# Patient Record
Sex: FEMALE | URBAN - NONMETROPOLITAN AREA
[De-identification: names, ages, dates, MRNs, and addresses within clinical notes are randomized per-mention and may not be internally consistent; named-entity substitution may affect disease eponyms.]

---

## 2021-10-22 VITALS
WEIGHT: 150 LBS | HEART RATE: 80 BPM | RESPIRATION RATE: 16 BRPM | OXYGEN SATURATION: 96 % | BODY MASS INDEX: 30.24 KG/M2 | HEIGHT: 59 IN | SYSTOLIC BLOOD PRESSURE: 120 MMHG | DIASTOLIC BLOOD PRESSURE: 68 MMHG

## 2021-10-22 DIAGNOSIS — M54.50 LOW BACK PAIN, UNSPECIFIED BACK PAIN LATERALITY, UNSPECIFIED CHRONICITY, UNSPECIFIED WHETHER SCIATICA PRESENT: ICD-10-CM

## 2021-10-22 DIAGNOSIS — M54.2 CERVICALGIA: ICD-10-CM

## 2021-10-22 RX ORDER — ACETAMINOPHEN 500 MG
1000 TABLET ORAL EVERY 6 HOURS PRN
COMMUNITY

## 2021-10-22 RX ORDER — GABAPENTIN 600 MG/1
600 TABLET ORAL 4 TIMES DAILY
COMMUNITY

## 2021-10-22 RX ORDER — MELOXICAM 15 MG/1
15 TABLET ORAL DAILY
COMMUNITY

## 2021-10-22 RX ORDER — SERTRALINE HYDROCHLORIDE 100 MG/1
100 TABLET, FILM COATED ORAL DAILY
COMMUNITY

## 2021-11-10 ENCOUNTER — OFFICE VISIT (OUTPATIENT)
Dept: PAIN MANAGEMENT | Age: 61
End: 2021-11-10
Payer: MEDICARE

## 2021-11-10 ENCOUNTER — TELEPHONE (OUTPATIENT)
Dept: PAIN MANAGEMENT | Age: 61
End: 2021-11-10

## 2021-11-10 VITALS — SYSTOLIC BLOOD PRESSURE: 124 MMHG | DIASTOLIC BLOOD PRESSURE: 78 MMHG

## 2021-11-10 DIAGNOSIS — M47.817 LUMBOSACRAL SPONDYLOSIS WITHOUT MYELOPATHY: Primary | ICD-10-CM

## 2021-11-10 DIAGNOSIS — M47.812 CERVICAL FACET SYNDROME: ICD-10-CM

## 2021-11-10 DIAGNOSIS — M47.817 LUMBOSACRAL SPONDYLOSIS WITHOUT MYELOPATHY: ICD-10-CM

## 2021-11-10 DIAGNOSIS — M47.816 LUMBAR FACET JOINT SYNDROME: ICD-10-CM

## 2021-11-10 DIAGNOSIS — M51.36 DDD (DEGENERATIVE DISC DISEASE), LUMBAR: ICD-10-CM

## 2021-11-10 PROCEDURE — 99204 OFFICE O/P NEW MOD 45 MIN: CPT | Performed by: PHYSICAL MEDICINE & REHABILITATION

## 2021-11-10 ASSESSMENT — ENCOUNTER SYMPTOMS
RESPIRATORY NEGATIVE: 1
CONSTIPATION: 0
BACK PAIN: 1
EYES NEGATIVE: 1
ALLERGIC/IMMUNOLOGIC NEGATIVE: 1
DIARRHEA: 0

## 2021-11-10 NOTE — TELEPHONE ENCOUNTER
[employer] ID# [id number]    PA for FJI  CPT [id number], [id number]  Dx- M47.817, M47.816, M51.36, Lumbar spondylosis wo myelopathy, Lumbar facet joint syndrome, DDD lumbar          I called [facility]. They do not do the PA for this pt. Contact Thaddeus

## 2021-11-10 NOTE — PATIENT INSTRUCTIONS
[facility]  [address], [place]    PROCEDURE INSTRUCTIONS FOR PAIN MANAGEMENT PROCEDURES    You are scheduled to see Dr. Marcos Camp to undergo the following procedure: Facet joint injection both sides L4-L5, L5-S1     Procedure Date: Wednesday December 15, 2021    Arrival Time: Shameka Santamaria will receive a call from [facility] to inform you of your arrival time the day prior to your procedure. Enter the facility through the ER doors and take the elevator to the 2nd floor and check in at same day surgery. 1. Stop the following medications prior to the procedure:     Stop blood thinners as directed before the injection, with permission from your cardiologist or primary care physician. We will send a letter to them requesting permission to hold the blood thinners. 2.  Take all routine medications unless otherwise instructed. Ok to take vitamins and antiinflammatory medications    3. EATING & DRINKING:  YOUR PROCEDURE MAY REQUIRE ANESTHESIA, NO FOOD OR LIQUIDS FOR 8 HOURS PRIOR TO THE PROCEDURE    4. If you are allergic to contrast or iodine, you must take benadryl and prednisone prior to the injection to prevent an allergic reaction. Follow the directions on the prescription for the times to take the medication. 5.  Wear simple loose clothing, which can be easily changed. 6.  Leave jewelry (including rings) and other valuables at home. 7.  Make arrangements for a family member or friend to drive you to the surgery center. Your ride must stay in the hospital while you are having the injection done. The sedation may affect your judgment following the procedure and driving a vehicle within 24 hours after the sedation could be dangerous. 8.  You will be asked to sign several forms prior to surgery; patients under the age of 25 must have a parent or legal guardian sign the permit to be able to do the procedure.       9.  You must have finished any antibiotic prescribed for recent infections. If required, please take pre-procedure antibiotic or other pre-procedure medications as instructed. 10. Bring inhalers and pain medications with you to your procedure. 11. Bring your MRI/CT films if they were done outside of the [facility]. 12. If you should develop a cold, sore throat, cough, fever or other new indication of illness or infection, or are started on antibiotics within 2 weeks of the scheduled procedure, please notify the [facility] office as early as possible at [phone].

## 2021-11-10 NOTE — PROGRESS NOTES
PAIN MANAGEMENTNEW PATIENT CONSULTATION  11/10/21    Delmer Cordero  1960    ReferringProvider:  No referring provider defined for this encounter. Primary Care Physician:  Dhaval Anderson, APRN - CNP  [address] [facility]    HPIinformation obtained from the patient as well as the Comprehensive Pain ManagementHealth Questionnaire filled out by the patient. The questionnaire will be scannedinto the electronic chart and PMH, PSH, meds, and allergies will be updates accordingly. Subjective:       CHIEF COMPLAINT:  This is a57 y.o. female patientwho presents with a complaint of New Patient (referred by henrietta duong for neck pain & lower back.  )      PAIN HPI:    Pain since accident 2014  In    Bilateral lumbar   L hip    B  Neck  Lower neck     L arm  Had fractures      Location: Back  Location Modifier: Brendon  Severity of Pain: 3  Duration of Pain: Intermittent  Frequency of Pain: Intermittent  Aggravating Factors: Stretching, Bending, Straightening, Standing, Walking, Stairs, Exercise, Kneeling and Squatting  Limiting Behavior: no  Relieving Factors: Heat, Cold and Medication -  Result of Injury: no  Work Related Injury: no  [place] of Worker Compensation Case: no      Prior evaluation:    Previous lower back problems:No    Previous workup: No   History of surgery in painful area:No    Previous pain medication trials have included:       Opioids: - does not wish to take     NSAID's:-     Muscle relaxants: -     Neuropathicpain meds: gabapentin    Previous Pain Management Physician: No   Nerve blocks, spinal injections:No   Typeof Pain Intervention . 0. Date of last Pain Intervention  January /2030   Was there pain relief from Pain Intervention: No.    How long was your pain relief from the Pain Intervention 4months.      Sleep:       Difficultyfalling asleep:  No   Takes sleepingmedication: No    Mental health:    Patient feels - secondary to their current pain problems as described above.   H/O depression and anxiety: No   Patient is not seeing psychologist orpsychiatrist   Abuse history? -    Employed? No  Alcohol use?: No  Tobacco use?: No  Marijuana use?: No  Illicit drug use?: No    Imaging: Reviewed available imagingin our system with the patient. No results found. Referring physician records reviewed. Review of Systems   Constitutional: Positive for fatigue. HENT: Negative. Eyes: Negative. Respiratory: Negative. Cardiovascular: Negative. Gastrointestinal: Negative for constipation and diarrhea. Endocrine: Negative. Genitourinary: Negative for difficulty urinating and flank pain. Musculoskeletal: Positive for back pain and myalgias. Skin: Negative. Allergic/Immunologic: Negative. Neurological: Positive for weakness and numbness. Hematological: Negative. Psychiatric/Behavioral: Positive for sleep disturbance. Allergies   Allergen Reactions    Nabumetone        Outpatient Medications Prior to Visit   Medication Sig Dispense Refill    acetaminophen (TYLENOL) 500 MG tablet Take 1,000 mg by mouth every 6 hours as needed for Pain      gabapentin (NEURONTIN) 600 MG tablet Take 600 mg by mouth 4 times daily.  meloxicam (MOBIC) 15 MG tablet Take 15 mg by mouth daily      Multiple Vitamin (MULTIVITAMIN ADULT PO) Take 1 tablet by mouth daily      sertraline (ZOLOFT) 100 MG tablet Take 100 mg by mouth daily      Tens Unit MISC by Does not apply route       No facility-administered medications prior to visit.        Past Medical History:   Diagnosis Date    Anemia in other chronic diseases classified elsewhere     Anxiety     Carpal tunnel syndrome     Cervicalgia     Depressive disorder     Inflammatory neuropathy (HCC)     Low back pain     Lumbar degenerative disc disease 2018    very mild from 2018, multiple levels, sl. disc space narrowing    Pain in both upper extremities     radiating from neck with numbness, tingling and burning    Reactive depression     Right hip pain     Stress     Tobacco use        Past Surgical History:   Procedure Laterality Date    BARIATRIC SURGERY      COLONOSCOPY      FACIAL SURGERY  01/2013    HIP SURGERY  01/2013       No family history on file. Social History     Socioeconomic History    Marital status: Single     Spouse name: None    Number of children: None    Years of education: None    Highest education level: None   Occupational History    None   Tobacco Use    Smoking status: Current Every Day Smoker     Packs/day: 0.25     Types: Cigarettes    Smokeless tobacco: Never Used   Substance and Sexual Activity    Alcohol use: Yes     Comment: monthly or less, 1 or 2    Drug use: Never    Sexual activity: None   Other Topics Concern    None   Social History Narrative    None     Social Determinants of Health     Financial Resource Strain:     Difficulty of Paying Living Expenses: Not on file   Food Insecurity:     Worried About Running Out of Food in the Last Year: Not on file    N[place] of Food in the Last Year: Not on file   Transportation Needs:     Lack of Transportation (Medical): Not on file    Lack of Transportation (Non-Medical):  Not on file   Physical Activity:     Days of Exercise per Week: Not on file    Minutes of Exercise per Session: Not on file   Stress:     Feeling of Stress : Not on file   Social Connections:     Frequency of Communication with Friends and Family: Not on file    Frequency of Social Gatherings with Friends and Family: Not on file    Attends Restoration Services: Not on file    Active Member of Clubs or Organizations: Not on file    Attends Club or Organization Meetings: Not on file    Marital Status: Not on file   Intimate Partner Violence:     Fear of Current or Ex-Partner: Not on file    Emotionally Abused: Not on file    Physically Abused: Not on file    Sexually Abused: Not on file   Housing Stability:     Unable to Pay for Housing in the Last Year: Not on file    Number of Places Lived in the Last Year: Not on file    Unstable Housing in the Last Year: Not on file         Objective:     Physical Exam:  Vitals:    11/10/21 1034   BP: 124/78   Site: Right Upper Arm   Position: Sitting   Cuff Size: Medium Adult          Physical Exam  Constitutional:       General: She is not in acute distress. Appearance: Normal appearance. She is well-developed. She is not diaphoretic. HENT:      Head: Normocephalic and atraumatic. Right Ear: External ear normal. No decreased hearing noted. Left Ear: External ear normal. No decreased hearing noted. Nose: Nose normal.   Eyes:      General: Lids are normal. No scleral icterus. Conjunctiva/sclera: Conjunctivae normal.   Neck:      Trachea: Phonation normal.   Cardiovascular:      Comments: No BLE edema present  Pulmonary:      Effort: Pulmonary effort is normal. No accessory muscle usage or respiratory distress. Genitourinary:     Comments: deferred  Musculoskeletal:      Lumbar back: Negative right straight leg raise test and negative left straight leg raise test.   Skin:     General: Skin is warm and dry. Coloration: Skin is not pale. Findings: No erythema or rash. Neurological:      Mental Status: She is alert and oriented to person, place, and time. Psychiatric:         Speech: Speech normal.         Behavior: Behavior normal.         Back Exam     Tenderness   The patient is experiencing tenderness in the lumbar and cervical ( facet  B lumbar ++ , mild facet tender cervical ).     Range of Motion   Extension: normal   Flexion: normal   Lateral bend right: normal   Lateral bend left: normal   Rotation right: normal   Rotation left: normal     Muscle Strength   Right Quadriceps:  5/5   Left Quadriceps:  5/5   Right Hamstrings:  5/5   Left Hamstrings:  5/5     Tests   Straight leg raise right: negative  Straight leg raise left: negative    Other   Toe walk: normal  Heel walk: normal  Sensation: normal  Gait: normal              Labs:   Lab Results   Component Value Date    WBC 5.7 01/11/2013    HGB 9.6 (L) 01/11/2013    HCT 28.6 (L) 01/11/2013     01/11/2013     01/14/2013    K 3.6 01/14/2013     01/14/2013    CREATININE 0.48 01/10/2013    BUN 8 01/10/2013    CO2 30 01/14/2013    INR 0.9 01/04/2013       Assessment: This is a 57 y.o. female with the following diagnosis:     Pain Diagnoses:  1. Lumbosacral spondylosis without myelopathy    2. Lumbar facet joint syndrome    3. DDD (degenerative disc disease), lumbar    4. Cervical facet syndrome        Medical/ Psychological Comorbidities:  As listed in the past medical and surgical history    Functional Limitations secondary to the above problems:  Chronic painlimits function and quality of life    Plan:     1. Brendon L4-5 5S1 facet steroid   2. Consider  Cervical MRI     Meds:   New Prescriptions    No medications on file      No orders of the defined types were placed in this encounter. Controlled Substances Monitoring:    OAR[place] report was reviewed for [place], [place]. Pt educated about the risks of taking opiates, including increasedsedation, constipation, slowed breathing, tolerance, dependence, and addiction. No orders of the defined types were placed in this encounter. No follow-ups on file. The patient expressed understanding of the above assessment and plan. Totaltime spent face to face with patient was 30 minutes inwhich  50% or more of the time was spent in counseling, education about risk andbenefits of the above plan, and coordination of care.

## 2021-11-16 NOTE — TELEPHONE ENCOUNTER
Clinicals faxed. Await decision. 12/15 FC schedule date. We may need to check with pt if she had PT and get records if available.

## 2021-11-16 NOTE — TELEPHONE ENCOUNTER
I called [phone] [place] provider services line. Transferred to [facility]. I spoke to Camryn Nolasco. L4-5, L5-S1 levels. Fax Clinicals [phone]. Use ID for Ref  Attn Nurse Reviewer with pt name,  and ID number.

## 2021-11-22 NOTE — TELEPHONE ENCOUNTER
[facility] called to state that this is still under clinical review and will be until 11/224/21. If you want to do a peer to peer review it must be done by 11/24/21, can call [phone].

## 2021-11-24 NOTE — TELEPHONE ENCOUNTER
Enedelia Huang from [facility] called and stated that Physcician requested [facility] injections, the team has reviewed all requests and case is currently  pending denial due to lack of documentation , and more is required.     Call back # [id number]     Reference for call include member ID, member first name and , and/or SS#

## 2021-11-30 NOTE — TELEPHONE ENCOUNTER
Pt's ID number for reference   [id number]    See denial scanned.   Can call for review [phone]

## 2021-12-01 NOTE — TELEPHONE ENCOUNTER
Denial  states   Will approve MBB only.     THerefore      Make      Brendon L4-5 5-S1 diagnostic  MBB    Thank You

## 2021-12-02 NOTE — TELEPHONE ENCOUNTER
Attempted an appeal thru [facility]. This case is closed, so I attempted to initiate a new case for MBB, diagnostic, see below from Dr. Irving Rodriges. Same CPT's. This needs to go thru the insurance since it is medicare.     Plan will be to fax appeal.  Faxed to [phone], asking for fast appeal

## 2021-12-07 NOTE — TELEPHONE ENCOUNTER
Case ID [id number]  Document ID [id number]    I spoke to [place] at [facility] on card. They did receive the appeal and it is under review. Appeal can take 45-60 days. Call Ref #[id number].

## 2022-01-19 NOTE — TELEPHONE ENCOUNTER
Patient called today to reschedule OV to 2/16/22. She is still awaiting to see if procedure will be approved.

## 2022-02-15 ENCOUNTER — TELEPHONE (OUTPATIENT)
Dept: PAIN MANAGEMENT | Age: 62
End: 2022-02-15

## 2022-02-22 NOTE — TELEPHONE ENCOUNTER
[id number] , [id number] CPT  I called provider services and was transferred to [facility] and was told to start a new request.  Done. Clinicals faxed to [phone] w/ ID, , Name, Health plan, attn Nurse Reviewer    They are requesting advanced imaging and PT, neither of which are current in pt's chart.

## 2022-03-01 NOTE — TELEPHONE ENCOUNTER
SAVITA for pt to call. Auth received from [id number] for facet joint injection. I am ready to schedule at [facility] 3/9. Wilbur Huizar is only valid thru 3/22. I could call for an extension if she cannot do 3/9.

## 2022-03-09 ENCOUNTER — PROCEDURE VISIT (OUTPATIENT)
Dept: PAIN MANAGEMENT | Age: 62
End: 2022-03-09
Payer: MEDICARE

## 2022-03-09 DIAGNOSIS — M47.817 LUMBOSACRAL SPONDYLOSIS WITHOUT MYELOPATHY: ICD-10-CM

## 2022-03-09 DIAGNOSIS — M47.816 LUMBAR FACET JOINT SYNDROME: ICD-10-CM

## 2022-03-09 PROCEDURE — 64494 INJ PARAVERT F JNT L/S 2 LEV: CPT | Performed by: PHYSICAL MEDICINE & REHABILITATION

## 2022-03-09 PROCEDURE — 64493 INJ PARAVERT F JNT L/S 1 LEV: CPT | Performed by: PHYSICAL MEDICINE & REHABILITATION

## 2022-03-09 NOTE — PROGRESS NOTES
MEDIAL BRANCH BLOCK   diagnostic  3/9/22    Surgeon: Joana Emery MD    Pre-operative Diagnosis: lumbar spondylosis           Lumbar facet syndrome     Post-operative Diagnosis: Same    INDICATION:Please see H&P for details on previous treatments, examination findings, and work up. bilateral L4-5 5-S1 medial branch blocks are requested for diagnostic reasons. Conservative treatment was ineffective i.e.: ice, NSAIDS, rest, narcotic medication, chiropractic care, physical therapy and message therapy. Patient is unable to perform the following ADL's: ambulating and grooming                         Last Plain films: 2020    EXAMINATION:  bilateral L4-5 5-S1 medial branch blocks. CONSENT:  Written consent was obtained from the patient on preprinted consent form after explaining the procedure, indications, potential complications and outcomes. Alternative treatments were also discussed. DISCUSSION:  The patient was sterilely prepped and draped in the usual fashion in the prone position. Time out was verified for correct patient, side, level and procedure. SEDATION:   No conscious sedation was performed during the procedure. The patient remained awake and conversed throughout the procedure. The patient underwent pulse oximetry and blood pressure monitoring independently by a trained observer, as well as by a physician. PROCEDURE:   Under image-intensifier control, 22 gauge needle x 5 inch spinal needles were directed into the bilateral L4-5 5-S1 medial branches of the dorsal rami at the target points, according to CHELI guidelines. Needle tip positions were confirmed with 0.2 mL of Omnipaque 240 contrast medium. Then, 1mL of equal volumes of 0.75% bupivacaine // 2% lidocaine / and Celestone> were instilled at each site.      EBL: no blood loss    SPECIMEN: none    The patient tolerated the procedure well and without complications and was noted to be in stable condition prior to discharge from the procedure center with discharge instructions. IMPRESSIONS:  1.     bilateral L4-5 5-S1 medial branch blocks performed uneventfully. 2.      bilateralL4-5 5-S1 medial branch blocks decreased pain to a 2 on 0 to 10 numeric pain scale at 15 and 30 minutes after the procedure. RECOMMENDATIONS:  1. Complete and return the \"Post-Procedure Pain and Activity Diary.   2. Contact me for symptom exacerbation, fever or unusual symptoms. 4.      Post-procedure care according to verbal and written instructions at discharge. 3. Follow this block with physical therapy, as per MD directions. 4. Consider confirmatory MBB if there is > 80% pain relief and improved activity scores. POST-PROCEDURE LUMBAR/CERVICAL SPINE FLUOROSCOPIC IMAGE INTERPRETATION:    EXAMINATION: AP, lateral, and oblique views. FLUORO TIME: 12 seconds    DISCUSSION:  Spot views of the spine reveal normal alignment and segmentation. Spinal needles are positioned at the base of the SAP at the junction with the transverse process/sacral ala  OR center of  the articular pillars on PA and lateral views. Contrast at needle tip confirms satisfactory placement. Visualized spine reveals bilateral See radiology report. Soft tissues reveal no abnormalities. IMPRESSION:  Satisfactory needle placement and contrast dispersal for bilateral L4-5 -S1 medial branch block.      Electronically signed by Court Villagomez MD on 3/9/2022 at 12:53 PM

## 2022-06-08 ENCOUNTER — TELEPHONE (OUTPATIENT)
Dept: PAIN MANAGEMENT | Age: 62
End: 2022-06-08

## 2022-06-08 ENCOUNTER — OFFICE VISIT (OUTPATIENT)
Dept: PAIN MANAGEMENT | Age: 62
End: 2022-06-08
Payer: MEDICARE

## 2022-06-08 VITALS — DIASTOLIC BLOOD PRESSURE: 80 MMHG | SYSTOLIC BLOOD PRESSURE: 120 MMHG

## 2022-06-08 DIAGNOSIS — M47.816 LUMBAR FACET JOINT SYNDROME: Primary | ICD-10-CM

## 2022-06-08 DIAGNOSIS — M54.06 PANNICULITIS INVOLVING LUMBAR REGION: ICD-10-CM

## 2022-06-08 PROCEDURE — 99214 OFFICE O/P EST MOD 30 MIN: CPT | Performed by: PHYSICAL MEDICINE & REHABILITATION

## 2022-06-08 ASSESSMENT — ENCOUNTER SYMPTOMS
ALLERGIC/IMMUNOLOGIC NEGATIVE: 1
CONSTIPATION: 0
DIARRHEA: 0
EYES NEGATIVE: 1
BACK PAIN: 1
RESPIRATORY NEGATIVE: 1

## 2022-06-08 NOTE — PROGRESS NOTES
PAIN MANAGEMENT FOLLOW-UP NOTE  6/8/22    CHIEF COMPLAINT: This is a57 y.o. female patientwho returns to the [facility] with a history of Pain (bilateral shoulder and back pain) and Follow-up (MBB in march)      PAIN Ana Martin returns today for  reevaluation. Since the visit, the patient reports that the pain is not changed. Ain B lumbar  After Lumbar Diag MBB got better few weeks  Some B neck pain     Location: Back  Location Modifier: B lumaf  Severity of Pain: 3  Duration of Pain: Intermittent  Frequency of Pain: Intermittent  Aggravating Factors: -  Limiting Behavior: Standing   Relieving Factors: relaxation        Previous pain medication trials have included:          Mental health:    Patient feels - secondary to their current pain problems as described above. H/O depression and anxiety: No   Patient is not seeing psychologist orpsychiatrist   Abuse history? No    Employed? No    ANALGESIA:   Are your Current Pain medication (s) helping to decrease pain? No.   Current Pain score:      ADVERSE AFFECTS:   Medication Side Effects: No.    ACTIVITY:  Are you able to be more active with your pain medications? No      ABERRANT BEHAVIORS SINCE LAST VISIT? No    Review of Systems   Constitutional: Positive for fatigue. HENT: Negative. Eyes: Negative. Respiratory: Negative. Cardiovascular: Negative. Gastrointestinal: Negative for constipation and diarrhea. Endocrine: Negative. Genitourinary: Negative for difficulty urinating and flank pain. Musculoskeletal: Positive for back pain and myalgias. Skin: Negative. Allergic/Immunologic: Negative. Neurological: Positive for weakness and numbness. Hematological: Negative. Psychiatric/Behavioral: Positive for sleep disturbance.         Allergies   Allergen Reactions    Nabumetone        Outpatient Medications Prior to Visit   Medication Sig Dispense Refill    acetaminophen (TYLENOL) 500 MG tablet Take 1,000 mg by mouth every 6 hours as needed for Pain      gabapentin (NEURONTIN) 600 MG tablet Take 600 mg by mouth 4 times daily.  meloxicam (MOBIC) 15 MG tablet Take 15 mg by mouth daily      Multiple Vitamin (MULTIVITAMIN ADULT PO) Take 1 tablet by mouth daily      sertraline (ZOLOFT) 100 MG tablet Take 100 mg by mouth daily      Tens Unit MISC by Does not apply route       No facility-administered medications prior to visit. Past Medical History:   Diagnosis Date    Anemia in other chronic diseases classified elsewhere     Anxiety     Carpal tunnel syndrome     Cervicalgia     Depressive disorder     Inflammatory neuropathy (HCC)     Low back pain     Lumbar degenerative disc disease 2018    very mild from 2018, multiple levels, sl. disc space narrowing    Pain in both upper extremities     radiating from neck with numbness, tingling and burning    Reactive depression     Right hip pain     Stress     Tobacco use        Past Surgical History:   Procedure Laterality Date    BARIATRIC SURGERY      COLONOSCOPY      FACIAL SURGERY  01/2013    HIP SURGERY  01/2013     No family history on file.   Social History     Socioeconomic History    Marital status: Single     Spouse name: None    Number of children: None    Years of education: None    Highest education level: None   Occupational History    None   Tobacco Use    Smoking status: Current Every Day Smoker     Packs/day: 0.25     Types: Cigarettes    Smokeless tobacco: Never Used   Substance and Sexual Activity    Alcohol use: Yes     Comment: monthly or less, 1 or 2    Drug use: Never    Sexual activity: None   Other Topics Concern    None   Social History Narrative    None     Social Determinants of Health     Financial Resource Strain:     Difficulty of Paying Living Expenses: Not on file   Food Insecurity:     Worried About Running Out of Food in the Last Year: Not on file    Nirav of Food in the Last Year: Not on file Abdomen is flat. Palpations: Abdomen is soft. Genitourinary:     Comments: deferred  Musculoskeletal:      Lumbar back: Negative right straight leg raise test and negative left straight leg raise test.   Skin:     General: Skin is warm and dry. Coloration: Skin is not pale. Findings: No erythema or rash. Neurological:      Mental Status: She is alert and oriented to person, place, and time. Psychiatric:         Speech: Speech normal.         Behavior: Behavior normal.       Back Exam     Tenderness   The patient is experiencing tenderness in the lumbar (+kemps). Range of Motion   Extension: normal   Flexion: normal   Lateral bend right: normal   Lateral bend left: normal   Rotation right: normal   Rotation left: normal     Muscle Strength   Right Quadriceps:  5/5   Left Quadriceps:  5/5   Right Hamstrings:  5/5   Left Hamstrings:  5/5     Tests   Straight leg raise right: negative  Straight leg raise left: negative    Other   Toe walk: normal  Heel walk: normal  Sensation: normal  Gait: normal                                   Research  has found that  Spine injections    reduce pain and  give  better functional  outcomes. Assessment: This is a 57 y.o. female patient with:    Diagnosis:   Diagnosis Orders   1. Lumbar facet joint syndrome     2. Panniculitis involving lumbar region         Medical Comorbidities:  As listed in the patient's past medical and surgical history    Functional Limitations:   Pain limits function and quality of life. Plan:   B L4-5 5-S1 Confirm MBB  Meds:   Controlled Substances Monitoring: Pt educated about the risks of taking opiates,including increased sedation, constipation, slowed breathing, tolerance, dependence,and addiction. New Prescriptions    No medications on file      No orders of the defined types were placed in this encounter. No orders of the defined types were placed in this encounter.       Return in about 1 week (around 6/15/2022) for intervention procedure B L4,5 5-S1 Confirm MBB. Opioid medication has  significant  risk  benefit concerns. We  instruct    our patients that  a Random Urine Drug Screen is required  along with a  an Opioid assessment questionaire such as ORT  or SOAPP  The patient expressed understanding of the above assessment and plan. Totaltime spent face to face with patient was 25 minutes inwhich  50% or more of the time was spent in counseling, education about risk andbenefits of the above plan, and coordination of care.

## 2022-06-08 NOTE — PATIENT INSTRUCTIONS
[facility]  [address], [place]    PROCEDURE INSTRUCTIONS FOR PAIN MANAGEMENT PROCEDURES    You are scheduled to see Dr. Doris Mejias to undergo the following procedure:   Confirmatory Medial branch block, Brendon L4-5, L5-S1    Procedure Date:  June 29 (Wed)    Arrival Time: You will receive a call from [facility] to inform you of your arrival time the day prior to your procedure. Enter the facility through the ER doors and take the elevator to the 2nd floor and check in at same day surgery. 1. Stop the following medications prior to the procedure:     Stop blood thinners as directed before the injection, with permission from your cardiologist or primary care physician. We will send a letter to them requesting permission to hold the blood thinners. 2.  Take all routine medications unless otherwise instructed. Ok to take vitamins and antiinflammatory medications    3. EATING & DRINKING:  YOUR PROCEDURE MAY REQUIRE ANESTHESIA, NO FOOD OR LIQUIDS FOR 8 HOURS PRIOR TO THE PROCEDURE    4. If you are allergic to contrast or iodine, you must take benadryl and prednisone prior to the injection to prevent an allergic reaction. Follow the directions on the prescription for the times to take the medication. 5.  Wear simple loose clothing, which can be easily changed. 6.  Leave jewelry (including rings) and other valuables at home. 7.  Make arrangements for a family member or friend to drive you to the surgery center. Your ride must stay in the hospital while you are having the injection done. The sedation may affect your judgment following the procedure and driving a vehicle within 24 hours after the sedation could be dangerous. 8.  You will be asked to sign several forms prior to surgery; patients under the age of 25 must have a parent or legal guardian sign the permit to be able to do the procedure.       9.  You must have finished any antibiotic prescribed for recent infections. If required, please take pre-procedure antibiotic or other pre-procedure medications as instructed. 10. Bring inhalers and pain medications with you to your procedure. 11. Bring your MRI/CT films if they were done outside of the [facility]. 12. If you should develop a cold, sore throat, cough, fever or other new indication of illness or infection, or are started on antibiotics within 2 weeks of the scheduled procedure, please notify the [facility] office as early as possible at [phone].

## 2022-06-08 NOTE — TELEPHONE ENCOUNTER
Check Anupama BRIZUELA for Conf MBB, L4-5, L5-S1, scheduled 6/29 at [facility].   (If I get an Marleen Simmering today, I can move pt sooner.)

## 2022-06-09 NOTE — TELEPHONE ENCOUNTER
CPT [id number], 01290  M47.816    PA initiated thru Provider Services # on back of [address] card. \"Ref\" [id number]  \"Pending AUTH\" also listed as Ref # (on fax we received)  [id number]  Faxed clinicals to [phone]. Await response.

## 2022-06-14 NOTE — TELEPHONE ENCOUNTER
Called [facility] provider [phone] to check status. Confirmed receipt of clinicals faxed. Am being told 8-9 business days remaining prior to decision.

## 2022-06-16 NOTE — TELEPHONE ENCOUNTER
Call received from [facility], stating call [facility] at [phone]    CPT [id number], [id number] Brendon Conf MBB L4-5, L5-S1  Dx M47.816    Case initiated thru [facility]. Clinicals faxed to [phone]  Attn: nurse reviewer with pt's name, , and ID on cover sheet. Provider has the choice to call [facility] same as above to discuss if desired.

## 2022-06-17 NOTE — TELEPHONE ENCOUNTER
Per Patricia Cordero at [facility] [facility] 6/29-7/13/22  Order #[id number]

## 2022-06-29 ENCOUNTER — PROCEDURE VISIT (OUTPATIENT)
Dept: PAIN MANAGEMENT | Age: 62
End: 2022-06-29
Payer: MEDICARE

## 2022-06-29 DIAGNOSIS — M47.816 LUMBAR FACET JOINT SYNDROME: ICD-10-CM

## 2022-06-29 DIAGNOSIS — M47.817 LUMBOSACRAL SPONDYLOSIS WITHOUT MYELOPATHY: ICD-10-CM

## 2022-06-29 PROCEDURE — 64493 INJ PARAVERT F JNT L/S 1 LEV: CPT | Performed by: PHYSICAL MEDICINE & REHABILITATION

## 2022-06-29 PROCEDURE — 64494 INJ PARAVERT F JNT L/S 2 LEV: CPT | Performed by: PHYSICAL MEDICINE & REHABILITATION

## 2022-06-29 NOTE — PROGRESS NOTES
MEDIAL BRANCH BLOCK Confirmatory    6/29/22    Surgeon: Estefani Cantu MD    Pre-operative Diagnosis: Lumbar Facet syndrome                                          Lumbar Panniculitis    Post-operative Diagnosis: Same    INDICATION:Please see H&P for details on previous treatments, examination findings, and work up. bilateral L4-5 5-S1 medial branch blocks are requested for diagnostic reasons. Conservative treatment was ineffective i.e.: ice, NSAIDS, rest, narcotic medication, chiropractic care, physical therapy and message therapy. Patient is unable to perform the following ADL's: ambulating and grooming                         Last Plain films: 2020    EXAMINATION:  bilateral L4-5 5-S1 medial branch blocks. CONSENT:  Written consent was obtained from the patient on preprinted consent form after explaining the procedure, indications, potential complications and outcomes. Alternative treatments were also discussed. DISCUSSION:  The patient was sterilely prepped and draped in the usual fashion in the prone position. Time out was verified for correct patient, side, level and procedure. SEDATION:   No conscious sedation was performed during the procedure. The patient remained awake and conversed throughout the procedure. The patient underwent pulse oximetry and blood pressure monitoring independently by a trained observer, as well as by a physician. PROCEDURE:   Under image-intensifier control, 22 gauge needle x 5 inch spinal needles were directed into the bilateral L4-5 -5S1 medial branches of the dorsal rami at the target points, according to CHELI guidelines. Needle tip positions were confirmed with 0.2 mL of Omnipaque 240 contrast medium. Then, 1mL of equal volumes of 0.75% bupivacaine // 2% lidocaine / and Celestone> were instilled at each site.      EBL: no blood loss    SPECIMEN: none    The patient tolerated the procedure well and without complications and was noted to be in stable condition prior to discharge from the procedure center with discharge instructions. IMPRESSIONS:  1.     bilateral L4-5 5-S1 medial branch blocks performed uneventfully. 2.      bilateralL4-5 L5-S1 medial branch blocks decreased pain to a 2 on 0 to 10 numeric pain scale at 15 and 30 minutes after the procedure. RECOMMENDATIONS:  1. Complete and return the \"Post-Procedure Pain and Activity Diary.   2. Contact me for symptom exacerbation, fever or unusual symptoms. 4.      Post-procedure care according to verbal and written instructions at discharge. 3. Follow this block with physical therapy, as per MD directions. 4. Consider confirmatory MBB if there is > 80% pain relief and improved activity scores. POST-PROCEDURE LUMBAR/CERVICAL SPINE FLUOROSCOPIC IMAGE INTERPRETATION:    EXAMINATION: AP, lateral, and oblique views. FLUORO TIME: 23 seconds    DISCUSSION:  Spot views of the spine reveal normal alignment and segmentation. Spinal needles are positioned at the base of the SAP at the junction with the transverse process/sacral ala  OR center of  the articular pillars on PA and lateral views. Contrast at needle tip confirms satisfactory placement. Visualized spine reveals bilateral See radiology report. Soft tissues reveal no abnormalities. IMPRESSION:  Satisfactory needle placement and contrast dispersal for bilateral L4-55-S1 medial branch block.      Electronically signed by Charlene Stephens MD on 6/29/2022 at 11:57 AM

## 2022-06-30 ENCOUNTER — TELEPHONE (OUTPATIENT)
Dept: PAIN MANAGEMENT | Age: 62
End: 2022-06-30

## 2022-06-30 NOTE — TELEPHONE ENCOUNTER
Patient called to follow up on her CMBB at [facility] yesterday, she stated that the pain reduced to a 2-3 and prior to the procedure her pain is at a 10 out of 10 and lasted for a few hours. Please call and schedule her for [facility], RFA.

## 2022-07-01 NOTE — TELEPHONE ENCOUNTER
LM for pt to call on google assist.  Brendon L4-5, L5-S1 RFA, no sedation  CPT 87406, 43784  Dx M54.06, M47.816    Will need to check [place] [phone] provider line. She transferred me to [facility] [phone]. I spoke to [facility] and was told to call back in two hours due to system difficulties.

## 2022-07-01 NOTE — TELEPHONE ENCOUNTER
Pt aware and agrees to [facility] . I called [facility] and spoke to [employer]. Case initiated. Clinicals faxed to Attn: nurse reviewer with name, ID #, , health plan on coversheet to [phone]. Option for peer to peer [phone]. Await review.

## 2022-07-08 NOTE — TELEPHONE ENCOUNTER
[facility]IM:  [phone]  [facility] [phone]    Please check if authorization is given for this WED 7/13 at [facility]. Peer to peer is an option as well. CPT [id number], [id number]    If no auth, will need to be canceled at [facility] surgery center [phone] and with the pt. Thank you.

## 2022-07-08 NOTE — TELEPHONE ENCOUNTER
I called [employer]. Still in review. Should have decision by tonight 7 PM Central time. They will not fax decision. They will mail. Peer to peer is still an option.

## 2022-07-11 NOTE — TELEPHONE ENCOUNTER
Procedure denied. Rep stated that Peer to peer was not an option. Only option would be to appeal.  Writer asked to have appeal faxed to office for our review with rep stating that they do not fax appeals. Only option is to call the appeals number on [facility].  surgery center and patient aware of cancellation.

## 2022-07-15 NOTE — TELEPHONE ENCOUNTER
Denial letter  dated 7/8/22 received and scanned into media for review. Please advise. Thank you! Lakesha Sin

## 2022-07-21 NOTE — TELEPHONE ENCOUNTER
RFA denied. Would you like to appeal?    Need letter to fax to [phone] per denial letter.     We will include pt's address and member ID and whether we need a fast or standard appeal.

## 2022-08-15 ENCOUNTER — TELEPHONE (OUTPATIENT)
Dept: PAIN MANAGEMENT | Age: 62
End: 2022-08-15